# Patient Record
Sex: FEMALE | Race: WHITE | NOT HISPANIC OR LATINO | Employment: UNEMPLOYED | URBAN - METROPOLITAN AREA
[De-identification: names, ages, dates, MRNs, and addresses within clinical notes are randomized per-mention and may not be internally consistent; named-entity substitution may affect disease eponyms.]

---

## 2017-11-15 ENCOUNTER — HOSPITAL ENCOUNTER (EMERGENCY)
Facility: HOSPITAL | Age: 42
Discharge: HOME | End: 2017-11-15
Attending: EMERGENCY MEDICINE
Payer: MEDICAID

## 2017-11-15 VITALS
HEART RATE: 95 BPM | OXYGEN SATURATION: 96 % | WEIGHT: 130 LBS | DIASTOLIC BLOOD PRESSURE: 73 MMHG | TEMPERATURE: 98 F | HEIGHT: 61 IN | RESPIRATION RATE: 18 BRPM | BODY MASS INDEX: 24.55 KG/M2 | SYSTOLIC BLOOD PRESSURE: 127 MMHG

## 2017-11-15 DIAGNOSIS — Z72.0 TOBACCO ABUSE: ICD-10-CM

## 2017-11-15 DIAGNOSIS — K64.4 EXTERNAL PROLAPSED HEMORRHOIDS: Primary | ICD-10-CM

## 2017-11-15 PROCEDURE — 99283 EMERGENCY DEPT VISIT LOW MDM: CPT

## 2017-11-15 RX ORDER — HYDROCORTISONE 1 %
CREAM (GRAM) TOPICAL
Qty: 30 G | Refills: 0 | Status: SHIPPED | OUTPATIENT
Start: 2017-11-15 | End: 2018-01-29

## 2017-11-15 RX ORDER — HYDROCORTISONE ACETATE 25 MG/1
25 SUPPOSITORY RECTAL 2 TIMES DAILY
Qty: 20 SUPPOSITORY | Refills: 0 | Status: SHIPPED | OUTPATIENT
Start: 2017-11-15 | End: 2017-11-25

## 2017-11-15 RX ORDER — IBUPROFEN 200 MG
1 TABLET ORAL DAILY
Qty: 14 PATCH | Refills: 0 | Status: SHIPPED | OUTPATIENT
Start: 2017-11-15

## 2017-11-16 NOTE — DISCHARGE INSTRUCTIONS
1) PLEASE FOLLOW UP WITH COLORECTAL SURGERY. YOU HAVE EXTERNAL HEMORRHOIDS THAT ARE PROLAPSED, NOT THROMBOSED. PLEASE USE THE TOPICAL CREAMS PRESCRIBED  2) PLEASE TAKE YOUR MEDICATIONS AS PRESCRIBED  3) RETURN TO THE ED FOR WORSENING SYMPTOMS

## 2017-11-27 NOTE — ED PROVIDER NOTES
Encounter Date: 11/15/2017       History     Chief Complaint   Patient presents with    Hemorrhoids     43 yo F here with painful rectum and had bright red blood on toilet this morning. Denies fatigue, SOB or CP. Has history of hemorrhoids. Hasn't seen a [facility] surgeon. Denies f/c/n/v. Has been constipated.          Review of patient's allergies indicates:   Allergen Reactions    Penicillins     Sulfa (sulfonamide antibiotics)      Past Medical History:   Diagnosis Date    Alcohol abuse     Arthritis     Bipolar affective disorder     Depression     Post traumatic stress disorder     Seizures      Past Surgical History:   Procedure Laterality Date    BACK SURGERY      DILATION AND CURETTAGE OF UTERUS       History reviewed. No pertinent family history.  Social History   Substance Use Topics    Smoking status: Current Every Day Smoker     Packs/day: 0.50     Years: 3.00     Types: Cigarettes    Smokeless tobacco: Never Used    Alcohol use 1.2 oz/week     2 Cans of beer per week      Comment: 2 beers daily     Review of Systems   Eyes: Negative.    Endocrine: Negative.    All other systems reviewed and are negative.      Physical Exam     Initial Vitals [11/15/17 1938]   BP Pulse Resp Temp SpO2   127/73 95 18 97.8 °F (36.6 °C) 96 %      MAP       91         Physical Exam    Nursing note and vitals reviewed.  Constitutional: She appears well-developed and well-nourished. She appears distressed.   HENT:   Head: Normocephalic and atraumatic.   Eyes: EOM are normal. Pupils are equal, round, and reactive to light.   Neck: Normal range of motion. Neck supple.   Cardiovascular: Normal rate and normal heart sounds.   Pulmonary/Chest: Breath sounds normal.   Genitourinary: Vagina normal and uterus normal. No vaginal discharge found.   Genitourinary Comments: External hemorrhoid, non thrombosed   Musculoskeletal: Normal range of motion.   Neurological: She is alert and oriented to person, place, and time. She has  normal strength. No cranial nerve deficit.   Skin: Skin is warm and dry.   Psychiatric: She has a normal mood and affect. Her behavior is normal. Thought content normal.         ED Course   Procedures  Labs Reviewed - No data to display          Medical Decision Makin yo well appearing F with prolapsed external hemorrhoid, non thrombosed. Given supportive therapy and follow up with CRC, return precautions. Patient was counseled on reasons to return to the ED and expressed understanding, patient was discharged in stable condition with appropriate outpatient follow up plan.                      ED Course      Clinical Impression:   The primary encounter diagnosis was External prolapsed hemorrhoids. A diagnosis of Tobacco abuse was also pertinent to this visit.                           Madelin Wong MD  [phone]

## 2018-01-29 ENCOUNTER — HOSPITAL ENCOUNTER (EMERGENCY)
Facility: OTHER | Age: 43
Discharge: HOME | End: 2018-01-29
Attending: EMERGENCY MEDICINE
Payer: MEDICAID

## 2018-01-29 VITALS
WEIGHT: 120 LBS | HEART RATE: 99 BPM | OXYGEN SATURATION: 99 % | SYSTOLIC BLOOD PRESSURE: 137 MMHG | RESPIRATION RATE: 19 BRPM | HEIGHT: 61 IN | DIASTOLIC BLOOD PRESSURE: 87 MMHG | TEMPERATURE: 98 F | BODY MASS INDEX: 22.66 KG/M2

## 2018-01-29 DIAGNOSIS — R11.2 NON-INTRACTABLE VOMITING WITH NAUSEA, UNSPECIFIED VOMITING TYPE: ICD-10-CM

## 2018-01-29 DIAGNOSIS — K27.9 PEPTIC ULCER DISEASE: ICD-10-CM

## 2018-01-29 DIAGNOSIS — R10.13 EPIGASTRIC ABDOMINAL PAIN: Primary | ICD-10-CM

## 2018-01-29 LAB
ABO + RH BLD: NORMAL
ALBUMIN SERPL BCP-MCNC: 4.3 G/DL
ALP SERPL-CCNC: 139 U/L
ALT SERPL W/O P-5'-P-CCNC: 16 U/L
ANION GAP SERPL CALC-SCNC: 14 MMOL/L
AST SERPL-CCNC: 28 U/L
BASOPHILS # BLD AUTO: 0.05 K/UL
BASOPHILS NFR BLD: 0.6 %
BILIRUB SERPL-MCNC: 1.4 MG/DL
BILIRUB UR QL STRIP: NEGATIVE
BLD GP AB SCN CELLS X3 SERPL QL: NORMAL
BUN SERPL-MCNC: 6 MG/DL
CALCIUM SERPL-MCNC: 8.9 MG/DL
CHLORIDE SERPL-SCNC: 103 MMOL/L
CLARITY UR: CLEAR
CO2 SERPL-SCNC: 21 MMOL/L
COLOR UR: YELLOW
CREAT SERPL-MCNC: 0.7 MG/DL
DIFFERENTIAL METHOD: ABNORMAL
EOSINOPHIL # BLD AUTO: 0.1 K/UL
EOSINOPHIL NFR BLD: 1.3 %
ERYTHROCYTE [DISTWIDTH] IN BLOOD BY AUTOMATED COUNT: 12.4 %
EST. GFR  (AFRICAN AMERICAN): >60 ML/MIN/1.73 M^2
EST. GFR  (NON AFRICAN AMERICAN): >60 ML/MIN/1.73 M^2
GLUCOSE SERPL-MCNC: 83 MG/DL
GLUCOSE UR QL STRIP: NEGATIVE
HCT VFR BLD AUTO: 44.8 %
HGB BLD-MCNC: 16 G/DL
HGB UR QL STRIP: ABNORMAL
KETONES UR QL STRIP: NEGATIVE
LEUKOCYTE ESTERASE UR QL STRIP: NEGATIVE
LIPASE SERPL-CCNC: 77 U/L
LYMPHOCYTES # BLD AUTO: 4 K/UL
LYMPHOCYTES NFR BLD: 51.6 %
MCH RBC QN AUTO: 33.3 PG
MCHC RBC AUTO-ENTMCNC: 35.7 G/DL
MCV RBC AUTO: 93 FL
MONOCYTES # BLD AUTO: 0.4 K/UL
MONOCYTES NFR BLD: 4.5 %
NEUTROPHILS # BLD AUTO: 3.3 K/UL
NEUTROPHILS NFR BLD: 41.9 %
NITRITE UR QL STRIP: NEGATIVE
PH UR STRIP: 7 [PH] (ref 5–8)
PLATELET # BLD AUTO: 228 K/UL
PMV BLD AUTO: 9.3 FL
POTASSIUM SERPL-SCNC: 4 MMOL/L
PROT SERPL-MCNC: 7.9 G/DL
PROT UR QL STRIP: NEGATIVE
RBC # BLD AUTO: 4.8 M/UL
SODIUM SERPL-SCNC: 138 MMOL/L
SP GR UR STRIP: <=1.005 (ref 1–1.03)
URN SPEC COLLECT METH UR: ABNORMAL
UROBILINOGEN UR STRIP-ACNC: NEGATIVE EU/DL
WBC # BLD AUTO: 7.79 K/UL

## 2018-01-29 PROCEDURE — 96361 HYDRATE IV INFUSION ADD-ON: CPT

## 2018-01-29 PROCEDURE — 96375 TX/PRO/DX INJ NEW DRUG ADDON: CPT

## 2018-01-29 PROCEDURE — 86850 RBC ANTIBODY SCREEN: CPT

## 2018-01-29 PROCEDURE — 25000003 PHARM REV CODE 250: Performed by: EMERGENCY MEDICINE

## 2018-01-29 PROCEDURE — 83690 ASSAY OF LIPASE: CPT

## 2018-01-29 PROCEDURE — 80053 COMPREHEN METABOLIC PANEL: CPT

## 2018-01-29 PROCEDURE — 63600175 PHARM REV CODE 636 W HCPCS: Performed by: EMERGENCY MEDICINE

## 2018-01-29 PROCEDURE — 81003 URINALYSIS AUTO W/O SCOPE: CPT

## 2018-01-29 PROCEDURE — 99285 EMERGENCY DEPT VISIT HI MDM: CPT | Mod: 25

## 2018-01-29 PROCEDURE — 85025 COMPLETE CBC W/AUTO DIFF WBC: CPT

## 2018-01-29 PROCEDURE — 96374 THER/PROPH/DIAG INJ IV PUSH: CPT

## 2018-01-29 RX ORDER — PHENYTOIN SODIUM 100 MG/1
CAPSULE, EXTENDED RELEASE ORAL 3 TIMES DAILY
COMMUNITY

## 2018-01-29 RX ORDER — ONDANSETRON 2 MG/ML
4 INJECTION INTRAMUSCULAR; INTRAVENOUS
Status: COMPLETED | OUTPATIENT
Start: 2018-01-29 | End: 2018-01-29

## 2018-01-29 RX ORDER — OMEPRAZOLE 20 MG/1
40 CAPSULE, DELAYED RELEASE ORAL DAILY
Qty: 30 CAPSULE | Refills: 0 | Status: SHIPPED | OUTPATIENT
Start: 2018-01-29 | End: 2019-01-29

## 2018-01-29 RX ORDER — HYDROMORPHONE HYDROCHLORIDE 1 MG/ML
0.2 INJECTION, SOLUTION INTRAMUSCULAR; INTRAVENOUS; SUBCUTANEOUS
Status: COMPLETED | OUTPATIENT
Start: 2018-01-29 | End: 2018-01-29

## 2018-01-29 RX ORDER — QUETIAPINE FUMARATE 200 MG/1
200 TABLET, FILM COATED ORAL DAILY
COMMUNITY

## 2018-01-29 RX ORDER — ONDANSETRON 4 MG/1
4 TABLET, ORALLY DISINTEGRATING ORAL EVERY 8 HOURS PRN
Qty: 12 TABLET | Refills: 0 | Status: SHIPPED | OUTPATIENT
Start: 2018-01-29

## 2018-01-29 RX ADMIN — ONDANSETRON 4 MG: 2 INJECTION, SOLUTION INTRAMUSCULAR; INTRAVENOUS at 02:01

## 2018-01-29 RX ADMIN — SODIUM CHLORIDE 500 ML: 900 INJECTION, SOLUTION INTRAVENOUS at 02:01

## 2018-01-29 RX ADMIN — HYDROMORPHONE HYDROCHLORIDE 0.2 MG: 1 INJECTION, SOLUTION INTRAMUSCULAR; INTRAVENOUS; SUBCUTANEOUS at 02:01

## 2018-01-29 NOTE — ED NOTES
Patient moved to ED room 4 via EMS, patient assisted onto stretcher and changed into a gown. Patient placed on  continuous pulse oximetry and automatic blood pressure cuff. Bed placed in low locked position, side rails up x 2, call light is within reach of patient or family, orientation to room and explanation of wait provided to family and patient, alarms set and turned on for monitor and pulse ox, awaiting MD evaluation and orders, will continue to monitor.

## 2018-01-29 NOTE — ED PROVIDER NOTES
Encounter Date: 1/29/2018    SCRIBE #1 NOTE: I, Sonja Castillo, am scribing for, and in the presence of, Dr. Araiza.       History     Chief Complaint   Patient presents with    Abdominal Pain     pt complaining of abd pain and n/v with vomiting blood this am  per patient.  patient admits to drinking 1 32oz can of Dash beer today.   pt laughing on EMS stretcher     Time seen by provider: 1:25 PM    This is a 42 y.o. female, with history of bipolar affective disorder and seizures, who presents with complaint of abdominal pain that began today. She reports nausea and vomiting blood. She states vomit was initially dark brown in color, transitioning to dark red and then to bright red. She denies experiencing similar symptoms in the past. She denies fever or blood in stool. She takes ibuprofen, but denies recently taking a large dose. She reports use of tobacco and use of alcohol this morning. She denies history of DM, and her LMP was two months ago. She is on Dilantin, and her last dose was three weeks ago.      The history is provided by the patient.     Review of patient's allergies indicates:   Allergen Reactions    Penicillins     Sulfa (sulfonamide antibiotics)      Past Medical History:   Diagnosis Date    Alcohol abuse     Arthritis     Bipolar affective disorder     Depression     Post traumatic stress disorder     Seizures      Past Surgical History:   Procedure Laterality Date    BACK SURGERY      DILATION AND CURETTAGE OF UTERUS       History reviewed. No pertinent family history.  Social History   Substance Use Topics    Smoking status: Current Every Day Smoker     Packs/day: 0.25     Years: 3.00     Types: Cigarettes    Smokeless tobacco: Never Used    Alcohol use 1.2 oz/week     2 Cans of beer per week      Comment: 2 beers daily     Review of Systems   Constitutional: Negative for chills and fever.   HENT: Negative for congestion and sore throat.    Eyes: Negative for photophobia and  redness.   Respiratory: Negative for cough and shortness of breath.    Cardiovascular: Negative for chest pain.   Gastrointestinal: Positive for abdominal pain, nausea and vomiting. Negative for blood in stool.        Positive for hematemesis.   Genitourinary: Negative for dysuria.   Musculoskeletal: Negative for back pain.   Skin: Negative for rash.   Neurological: Negative for weakness, light-headedness and headaches.   Psychiatric/Behavioral: Negative for confusion.       Physical Exam     Initial Vitals [01/29/18 1218]   BP Pulse Resp Temp SpO2   (!) 167/81 103 16 98.2 °F (36.8 °C) 98 %      MAP       109.67         Physical Exam    Nursing note and vitals reviewed.  Constitutional: She appears well-developed and well-nourished. She is not diaphoretic. No distress.   HENT:   Head: Normocephalic and atraumatic.   Mouth/Throat: Oropharynx is clear and moist.   Eyes: Conjunctivae and EOM are normal. Pupils are equal, round, and reactive to light. No scleral icterus.   Neck: Normal range of motion. Neck supple.   Cardiovascular: Normal rate, regular rhythm, S1 normal, S2 normal and normal heart sounds. Exam reveals no gallop and no friction rub.    No murmur heard.  Pulmonary/Chest: Breath sounds normal. No respiratory distress. She has no wheezes. She has no rhonchi. She has no rales.   Abdominal: Soft. Bowel sounds are normal. There is no rebound and no guarding.   There is mid epigastric tenderness.   Musculoskeletal: Normal range of motion. She exhibits no edema or tenderness.   No lower extremity edema.    Lymphadenopathy:     She has no cervical adenopathy.   Neurological: She is alert and oriented to person, place, and time.   Skin: Skin is warm and dry. Capillary refill takes less than 2 seconds. No rash noted. No pallor.   Psychiatric: She has a normal mood and affect. Her behavior is normal. Judgment and thought content normal.         ED Course   Procedures  Labs Reviewed   CBC W/ AUTO DIFFERENTIAL -  Abnormal; Notable for the following:        Result Value    MCH 33.3 (*)     Lymph% 51.6 (*)     All other components within normal limits   COMPREHENSIVE METABOLIC PANEL - Abnormal; Notable for the following:     CO2 21 (*)     Total Bilirubin 1.4 (*)     Alkaline Phosphatase 139 (*)     All other components within normal limits   LIPASE - Abnormal; Notable for the following:     Lipase 77 (*)     All other components within normal limits   URINALYSIS - Abnormal; Notable for the following:     Specific Gravity, UA <=1.005 (*)     Occult Blood UA Trace (*)     All other components within normal limits   TYPE & SCREEN     Imaging Results          US Abdomen Limited (Final result)  Result time 01/29/18 18:19:36    Final result by Ericka Connor MD (01/29/18 18:19:36)                 Impression:      No significant abnormalities or acute process seen.      Electronically signed by: ERICKA CONNOR MD  Date:     01/29/18  Time:    18:19              Narrative:    Time of Procedure: 01/29/18 16:57:53  Accession # [id number]    Reason for study: 42-year-old female with abdominal pain.    Comparison: CT abdomen and pelvis 9/2015.    Technique: Limited right upper quadrant ultrasound was performed.    Findings: The liver is normal in size measuring 14.6cm.  Hepatic parenchyma is homogeneous without evidence for masses.  No intra- or extrahepatic biliary ductal dilatation. The common bile duct measures 0.5 cm.  The gallbladder appears normal. No evidence for cholelithiasis.  Sonographic Mace's sign is negative. The visualized portions of the pancreas, IVC, and aorta appear normal. The right kidney measures 10.1 cm. No ascites.                                    Medical Decision Making:   Clinical Tests:   Lab Tests: Ordered and Reviewed  Radiological Study: Ordered and Reviewed            Scribe Attestation:   Scribe #1: I performed the above scribed service and the documentation accurately describes the services I  performed. I attest to the accuracy of the note.    Attending Attestation:           Physician Attestation for Scribe:  Physician Attestation Statement for Scribe #1: I, Dr. Ramirez, reviewed documentation, as scribed by Sonja Castillo in my presence, and it is both accurate and complete.         Attending ED Notes:   Emergent evaluation a 42-year-old female complaining of midepigastric abdominal pain with nausea vomiting with what patient describes as dark vomit that then turned into bright red blood.  Patient is afebrile, nontoxic-appearing with stable vital signs.  Patient has mild midepigastric tenderness to palpation.  Patient did not have any episodes of nausea or vomiting in the emergency room.  Patient was unable to tolerate NG tube for lavage.  Patient H&H is 16 and 44.8.  No elevation of white blood cell count.  Urinary analysis reveals trace blood.  No acute findings on CMP except for elevation in total bili and alkaline phosphatase.  Lipase is 77.  Right upper quadrant ultrasound is without any acute findings.  The patient is extensively counseled on her diagnosis and treatment including all diagnostic, laboratory and physical exam findings.  The patient discharged in good condition, tolerating by mouth intake without complication and directed to follow-up with gastroenterology in the next 24-48 hours.          ED Course      Clinical Impression:     1. Epigastric abdominal pain    2. Non-intractable vomiting with nausea, unspecified vomiting type    3. Peptic ulcer disease                               Schuyler Ramirez MD  01/29/18 2029

## 2018-01-29 NOTE — ED TRIAGE NOTES
"Patient presents to ER via EMS w/ reports of + ETOH use today. Pt reports + generalized abd pains described as " I am just hungry". Patient reports + alcohol consumption of " A 40 oz of beer this morning". Denies N/V/D, dizziness, blurred vision,  Weakness, fatigue at this time.   "